# Patient Record
Sex: FEMALE | Race: WHITE | NOT HISPANIC OR LATINO | Employment: UNEMPLOYED | RURAL
[De-identification: names, ages, dates, MRNs, and addresses within clinical notes are randomized per-mention and may not be internally consistent; named-entity substitution may affect disease eponyms.]

---

## 2020-09-09 ENCOUNTER — OFFICE VISIT (OUTPATIENT)
Dept: OTOLARYNGOLOGY | Facility: CLINIC | Age: 20
End: 2020-09-09

## 2020-09-09 VITALS — WEIGHT: 166 LBS | HEIGHT: 68 IN | BODY MASS INDEX: 25.16 KG/M2 | TEMPERATURE: 97.8 F

## 2020-09-09 DIAGNOSIS — J35.01 CHRONIC TONSILLITIS: Primary | ICD-10-CM

## 2020-09-09 PROCEDURE — 99203 OFFICE O/P NEW LOW 30 MIN: CPT | Performed by: OTOLARYNGOLOGY

## 2020-09-09 RX ORDER — CLINDAMYCIN HYDROCHLORIDE 150 MG/1
300 CAPSULE ORAL 3 TIMES DAILY
Qty: 126 CAPSULE | Refills: 0 | Status: SHIPPED | OUTPATIENT
Start: 2020-09-09

## 2020-09-09 NOTE — PROGRESS NOTES
Subjective   Essence Thomas is a 19 y.o. female.   Patient has history of white patches on tonsils  History of Present Illness   Says she is had problems with her tonsils in the past but the last 2 months and had constant white patches on her tonsils some time to smells and taste.  She is in the past use Q-tips removed out she is not really having a lot of sore throat fever chills or adenopathy in her neck says she is not pregnant though she is allergic to penicillin had a severe reaction to that she is currently not a smoker      The following portions of the patient's history were reviewed and updated as appropriate: allergies, current medications, past family history, past medical history, past social history, past surgical history and problem list.      Essence Thomas reports that she has quit smoking. Her smoking use included electronic cigarette. She has never used smokeless tobacco. She reports that she drank alcohol. She reports that she does not use drugs.  Patient is not a tobacco user and has been counseled for use of tobacco products    Family History   Problem Relation Age of Onset   • Hypertension Mother    • Rashes / Skin problems Mother    • No Known Problems Father    • No Known Problems Brother    • Hypertension Half-Sister          Current Outpatient Medications:   •  clindamycin (CLEOCIN) 150 MG capsule, Take 2 capsules by mouth 3 (Three) Times a Day., Disp: 126 capsule, Rfl: 0    Allergies   Allergen Reactions   • Penicillins Hives       Past Medical History:   Diagnosis Date   • Lumbar herniated disc        History reviewed. No pertinent surgical history.    Review of Systems   Constitutional: Negative for fever.   HENT: Positive for mouth sores and sore throat.    Respiratory: Negative for apnea.    Hematological: Negative for adenopathy.   All other systems reviewed and are negative.          Objective   Physical Exam   Constitutional: She appears well-developed and well-nourished.   HENT:    Head: Normocephalic.   Right Ear: External ear normal.   Left Ear: External ear normal.   Nose: Nose normal.   Mouth/Throat: Oropharynx is clear and moist and mucous membranes are normal. Tonsils are 2+ on the right. Tonsils are 2+ on the left. Tonsillar exudate.   Eyes: Conjunctivae are normal.   Neck: Neck supple.       Pulmonary/Chest: Effort normal.   Lymphadenopathy:     She has no cervical adenopathy.   Neurological: She is alert.   Skin: Skin is warm.   Psychiatric: She has a normal mood and affect. Thought content normal.   Nursing note and vitals reviewed.            Assessment/Plan   Essence was seen today for sore throat and mouth lesions.    Diagnoses and all orders for this visit:    Chronic tonsillitis    Other orders  -     clindamycin (CLEOCIN) 150 MG capsule; Take 2 capsules by mouth 3 (Three) Times a Day.    Discussed treatment options of observation or antibiotics we will use penicillin because of her remote drug allergy.  Discussed that the ultimate option is tonsillectomy but will try conservative approach and suggesting a 3-week treatment of clindamycin with use of probiotics explained how to use them with signs symptoms look for and if she has significant diarrhea she is to discontinue and let us know explained the risk benefits of the medication she accepted and she denies pregnancy we will see her back about 3 weeks after she is finished the antibiotic and was given a prescription to last for 21 days she is to call if any questions or problems in the meantime

## 2020-09-09 NOTE — PATIENT INSTRUCTIONS
